# Patient Record
Sex: MALE | Race: OTHER | HISPANIC OR LATINO
[De-identification: names, ages, dates, MRNs, and addresses within clinical notes are randomized per-mention and may not be internally consistent; named-entity substitution may affect disease eponyms.]

---
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
## 2024-01-11 ENCOUNTER — APPOINTMENT (OUTPATIENT)
Dept: OTOLARYNGOLOGY | Facility: CLINIC | Age: 19
End: 2024-01-11
Payer: COMMERCIAL

## 2024-01-11 VITALS
OXYGEN SATURATION: 99 % | WEIGHT: 160 LBS | DIASTOLIC BLOOD PRESSURE: 83 MMHG | SYSTOLIC BLOOD PRESSURE: 130 MMHG | HEIGHT: 66 IN | BODY MASS INDEX: 25.71 KG/M2 | TEMPERATURE: 209.3 F | HEART RATE: 70 BPM

## 2024-01-11 DIAGNOSIS — D10.6 BENIGN NEOPLASM OF NASOPHARYNX: ICD-10-CM

## 2024-01-11 PROCEDURE — 99214 OFFICE O/P EST MOD 30 MIN: CPT | Mod: 25

## 2024-01-11 PROCEDURE — 31231 NASAL ENDOSCOPY DX: CPT

## 2024-01-12 ENCOUNTER — APPOINTMENT (OUTPATIENT)
Dept: OTOLARYNGOLOGY | Facility: CLINIC | Age: 19
End: 2024-01-12

## 2024-01-16 PROBLEM — D10.6: Status: ACTIVE | Noted: 2021-03-09

## 2024-01-16 NOTE — REASON FOR VISIT
[Subsequent Evaluation] : a subsequent evaluation for [FreeTextEntry2] : s/p excision recurrent JNA, septoplasty 6/26/23

## 2024-01-16 NOTE — HISTORY OF PRESENT ILLNESS
[de-identified] : 18 year old male s/p excision recurrent JNA, septoplasty 6/26/23 presents for follow up LCV 8/10/23 at which time prescribed medrol, sent for MRI MRI Orbit Face Neck 8/29/23 Impression Linear soft tissue thickening along the inferior margin of the skull base/nasopharyngeal roof as well as along the right lateral nasopharyngeal wall. While this may represent residual adenoidal tissue or postoperative change, the possibility of residual tumor is not excluded and continued follow-up is advised.  Completed Medrol Using saline rinses

## 2024-03-16 ENCOUNTER — APPOINTMENT (OUTPATIENT)
Dept: MRI IMAGING | Facility: CLINIC | Age: 19
End: 2024-03-16

## 2024-03-16 ENCOUNTER — OUTPATIENT (OUTPATIENT)
Dept: OUTPATIENT SERVICES | Facility: HOSPITAL | Age: 19
LOS: 1 days | End: 2024-03-16
Payer: COMMERCIAL

## 2024-03-16 DIAGNOSIS — Z98.890 OTHER SPECIFIED POSTPROCEDURAL STATES: Chronic | ICD-10-CM

## 2024-03-16 DIAGNOSIS — Z86.018 PERSONAL HISTORY OF OTHER BENIGN NEOPLASM: Chronic | ICD-10-CM

## 2024-03-16 DIAGNOSIS — D10.6 BENIGN NEOPLASM OF NASOPHARYNX: ICD-10-CM

## 2024-03-16 PROCEDURE — 70543 MRI ORBT/FAC/NCK W/O &W/DYE: CPT | Mod: 26

## 2024-06-11 NOTE — ASU PREOPERATIVE ASSESSMENT, ADULT ([facility] ONLY) - INV PERIPH IV SIZE
PAST MEDICAL HISTORY:  Diverticulitis     Hypercholesterolemia     Hypertension     No Past Medical History      20 gauge PAST MEDICAL HISTORY:  Diverticulitis     Hypercholesterolemia     Hypertension

## 2024-08-19 NOTE — DISCHARGE NOTE NURSING/CASE MANAGEMENT/SOCIAL WORK - FLU SEASON?
No 26 year old male no significant PMH presents to the ED for evaluation of low back pain. Patient states he woke up with some left lower back pain that acutely worsened when he bent over at work to  a water. Since then the only position of comfort has been standing, extension of spine. Patient denies any numbness/tingling, falls, fevers, incontinence/retention, or other concerning symptoms.

## (undated) DEVICE — WARMING BLANKET FULL ADULT

## (undated) DEVICE — CONN FEMALE LUER ADAPTOR SM XMAS TREE

## (undated) DEVICE — BLADE MEDTRONIC ENT TRICUT ROTATABLE STRAIGHT 4MM X 11CM

## (undated) DEVICE — SUT CHROMIC 4-0 18" G-2

## (undated) DEVICE — DRSG NASOPORE 4CM FIRM

## (undated) DEVICE — CATH NG SALEM SUMP 14FR

## (undated) DEVICE — TUBING SUCTION NONCONDUCTIVE 6MM X 12FT

## (undated) DEVICE — S&N ARTHROCARE ENT WAND PLASMA EVAC 70 XTRA T&A

## (undated) DEVICE — LABELS BLANK W PEN

## (undated) DEVICE — TUBING IRRIGATION STRAIGHT SHOT

## (undated) DEVICE — SUCTION COAGULATOR HAND CONTROL 10FR X 6"

## (undated) DEVICE — CANISTER DISPOSABLE THIN WALL 3000CC

## (undated) DEVICE — VAGINAL PACKING 0.5"

## (undated) DEVICE — SUT VICRYL 3-0 27" SH

## (undated) DEVICE — SOL ANTI FOG

## (undated) DEVICE — SYR LUER LOK 10CC

## (undated) DEVICE — Device

## (undated) DEVICE — PACKING GAUZE PLAIN 0.5"

## (undated) DEVICE — ENDO SCRUB

## (undated) DEVICE — URETERAL CATH RED RUBBER 20FR (YELLOW)

## (undated) DEVICE — ACCLARENT SET INFLATION DEVICE

## (undated) DEVICE — CATH IV SAFE INSYTE 14G X 1.75" (ORANGE)

## (undated) DEVICE — DRSG MEROCEL 2000 WITH STRING 8CM

## (undated) DEVICE — BLADE MEDTRONIC ENT FUSION TRICUT ROTATABLE STRAIGHT 4MM X 13CM

## (undated) DEVICE — POSITIONER FOAM EGG CRATE ULNAR 2PCS (PINK)

## (undated) DEVICE — SYR CONTROL LUER LOK 10CC

## (undated) DEVICE — DRSG NASOPORE 8CM FIRM

## (undated) DEVICE — BASIN SET DOUBLE

## (undated) DEVICE — PACK T & A

## (undated) DEVICE — WARMING BLANKET FULL UNDERBODY

## (undated) DEVICE — SYR LUER LOK 5CC

## (undated) DEVICE — ELCTR BOVIE SUCTION 8FR 6"

## (undated) DEVICE — ELCTR ENT BOVIE SUCTION 10FR 6"

## (undated) DEVICE — WARMING BLANKET LOWER ADULT

## (undated) DEVICE — DRSG TELFA 3 X 8

## (undated) DEVICE — VENODYNE/SCD SLEEVE CALF MEDIUM

## (undated) DEVICE — DRAPE 3/4 SHEET 52X76"

## (undated) DEVICE — CLEANING SHEATH ENDO-SCRUB FOR STORZ 7210AA TELESCOPE 4MM 0 DEGREE

## (undated) DEVICE — GLV 7.5 PROTEXIS (WHITE)

## (undated) DEVICE — SUT CHROMIC 4-0 18" G-3

## (undated) DEVICE — LIJ-MEDTRONIC FUSION ENT NAVIGATION SET: Type: DURABLE MEDICAL EQUIPMENT

## (undated) DEVICE — NDL HYPO REGULAR BEVEL 25G X 1.5" (BLUE)

## (undated) DEVICE — SUT ETHILON 4-0 18" P-3

## (undated) DEVICE — MEDTRONIC AXIEM PATIENT TRACKER NON-INVASIVE

## (undated) DEVICE — MEDTRONIC INSTRUMENT TRACKER ENT

## (undated) DEVICE — ELCTR GROUNDING PAD ADULT COVIDIEN

## (undated) DEVICE — POSITIONER STRAP ARMBOARD VELCRO TS-30

## (undated) DEVICE — DRAPE SPLIT SHEET 77" X 108"

## (undated) DEVICE — SUT PLAIN GUT 4-0 18" SC-1

## (undated) DEVICE — PACK SMR

## (undated) DEVICE — DRAPE TOWEL BLUE 17" X 24"

## (undated) DEVICE — DRSG SPLINT INTRA NASAL .5MM OVERSIZE THICK

## (undated) DEVICE — LIJ/LIA-ESU VALLEYLAB FORCETRIAD T2D28932EX: Type: DURABLE MEDICAL EQUIPMENT

## (undated) DEVICE — SOL IRR POUR NS 0.9% 500ML

## (undated) DEVICE — PAD MEDTRONIC ENT ADHESIVE PAD

## (undated) DEVICE — DRSG SPLINT INTRA NASAL .5MM STANDARD THICK

## (undated) DEVICE — SUT ETHILON 3-0 30" FS-1

## (undated) DEVICE — DRAPE BACK TABLE COVER 44X90"

## (undated) DEVICE — VISITEC 4X4

## (undated) DEVICE — BUR MEDTRONIC ENT HIGH SPEED CURVED DIAMOND 15 DEGREE 4.0MM X 15CM

## (undated) DEVICE — URETERAL CATH RED RUBBER 12FR (WHITE)

## (undated) DEVICE — URETERAL CATH RED RUBBER 8FR

## (undated) DEVICE — STRYKER MALLEABLE SUCTION MEDIUM STANDARD

## (undated) DEVICE — POSITIONER PATIENT SAFETY STRAP 3X60"

## (undated) DEVICE — TONSIL ROLLS

## (undated) DEVICE — SUT VICRYL 3-0 27" SH UNDYED

## (undated) DEVICE — MARKING PEN W RULER